# Patient Record
(demographics unavailable — no encounter records)

---

## 2017-04-04 NOTE — NUR
[facility] due to siezure episode that lasted for 30 sec 
pta. Patient is aa03,. in no acute distress, respiration even and unlabored. 
Patient's skin is warm to touch and non diaphoretic. Afebrile,. Noted with left 
eye brow open wound sp fall post seizure. Unknown tetanus vaccine date iv on 
rac 20 intact and patent

## 2019-03-20 NOTE — NUR
RN Notes



Received patient awake, verbal and oriented x1 with mental retardation. Patient denies any 
pain and discomfort at this time. Telemonitor reads sinus rhythm with heart rate at 88. IV 
access on right AC patent and intact with ongoing IVF infusing well. Safety measures and 
fall precaution in place with call light within reach with sitter at bedside. Will continue 
to monitor patient.

## 2019-03-20 NOTE — NUR
PT GABINO FROM ADULT  CTR WITH CAREGIVER S/P SZ; PT ALERT, AWAKE, 
VERBALLY REPSONIVE, NAD NOTED, VSS, PT ON MONITOR, MD AT BEDSIDE FOR EVAL

## 2019-03-20 NOTE — NUR
Tele/RN - Admission

Received patient from ER, awake, alert to self, developmentally delayed, verbally 
responsive, reality orientation given. Patient is stable on room air, denies pain at this 
time, tele shows SR, VSS, no seizure activity seen. Skin assessment done, noted with 
blanchable redness, photo taken. patient independent with bed mobility. Patient oriented to 
the room and use of call light. All belongings accounted. Seizure, fall and aspiration 
precautions initiated. Admission orders noted and implemented. Patient resides at [facility], records in the chart. Will continue with current medical management and will 
endorsed to night RN accordingly.

## 2019-03-20 NOTE — NUR
REPORT GIVEN TO ROSALIE SCHAEFER FOR GHISLAINE; PT WILL BE TRANSPORTED TO [address] [place] [address] VIA ACLS 
PROTOCOL

## 2019-03-21 NOTE — NUR
MS RN NOTE



RECEIVED PT IN STABLE CONDITION A&O X1. NO SIGNS OF SOB OR DISTRESS, NO C/O PAIN. 1:1 SITTER 
AT BEDSIDE. ALL CURRENT NEEDS MET. SAFETY MEASURES IN PLACE: BED LOW, LOCKED, UPPER RAILS 
UP, FALL PRECAUTIONS, SEIZURE PRECAUTIONS, CALL LIGHT WITHIN REACH. WILL CONT TO MONITOR.

## 2019-03-21 NOTE — NUR
MS RN NOTES

PATIENT IN BED ALERT ORIENTED X 1. NO ACUTE DISTRESS NOTED. BREATHING UNLABORED. NO SOB 
NOTED. SITTER AT BEDSIDE. IV ACCESS PATENT AND INTACT, NO REDNESS OR SWELLING NOTED. DUE 
MEDICATIONS GIVEN, NO ASE NOTED. SAFETY MEASURES IN PLACE. HOB ELEVATED. CALL LIGHT WITHIN 
REACH. WILL CONTINUE TO MONITOR AND ENDORSE TO NIGHT NURSE FOR CONTINUITY OF CARE.

## 2019-03-21 NOTE — NUR
MS RN NOTE



PT SEEN AND EXAMINED BY DR. DUPREE WITH NEW ORDER TO INCREASE VIMPAT  MG Q12. NEW 
ORDER CARRIED OUT.

## 2019-03-21 NOTE — NUR
RN Notes



Patient slept on and off, with some confusion. Denies pain, nausea and vomiting. Tele 
monitor reads sinus rhythm with heart rate at 67. No episode of seizure noted. Seizure 
precaution observed. No significant change in condition noted. Safety measures observed with 
sitter at bedside. Patient assisted to the bathroom with assist. kept clean and dry. All 
needs attended. Will endorse to morning RN for continuity of care.

## 2019-03-21 NOTE — NUR
TELE RN NOTES

PATIENT IN BED EYES CLOSED, EASY TO AROUSE. NO ACUTE DISTRESS NOTED. BREATHING UNLABORED. NO 
SOB NOTED. SITTER AT BEDSIDE. IV ACCESS PATENT AND INTACT, NO REDNESS OR SWELLING NOTED. 
SAFETY MEAURES IN PLACE. CALL LIGHT WITHIN REACH. WILL CONTINUE TO MONITOR ACCORDINGLY.

## 2019-03-22 NOTE — NUR
MS RN NOTE



PT IN STABLE CONDITION A&O X1. NO SIGNS OF SOB OR DISTRESS, NO C/O PAIN. SITTER AT BEDSIDE. 
ALL CURRENT NEEDS MET. SAFETY MEASURES IN PLACE: BED LOW, LOCKED, UPPER RAILS UP, FALL 
PRECAUTIONS, SEIZURE PRECAUTIONS, CALL LIGHT WITHIN REACH. WILL CONT TO MONITOR AND ENDORSE 
TO NEXT SHIFT FOR GHISLAINE.

## 2019-03-22 NOTE — NUR
MS RN DISCHARGE NOTES



PATIENT DISCHARGED HOME AT [facility] WITH STABLE VITAL SIGNS,ALERT ORIENTED X 1. NO 
ACUTE DISTRESS NOTED. BREATHING UNLABORED. NO SOB NOTED. DISCHARGE INSTRUCTIONS HANDED OVER 
TO EMT PERSONNEL INCLUDING NEW PRESCRIPTION. ALL BELONGINS ACCOUNTED FOR. SKIN IS INTACT. IV 
ACCESS REMOVED, NO BLEEDING OR REDNESS NOTED. PATIENT PICKED UP VIA AMBULANCE IN A GURNEY 
ACCOMPANIED BY 2 EMT PERSONNEL IN STABLE CONDITION.

## 2019-03-22 NOTE — NUR
MS RN NOTES

MEDICATION VIMPAT NOT AVAILABLE AT THIS TIME PER PHARMACY, SPOKE WITH PHARMACIST BLAS. NP 
EZRA JUNG NOTIFIED.

## 2019-03-22 NOTE — NUR
MS RN NOTES

PATIENT IN BED EYES CLOSED, EASY TO AROUSE. NO ACUTE DISTRESS NOTED. BREATHING UNLABORED. NO 
SOB NOTED. SITTER AT BEDSIDE. IV ACCESS PATENT AND INTACT, NO REDNESS OR SWELLING NOTED. 
SAFETY MEASURES IN PLACE. CALL LIGHT WITHIN REACH. WILL CONTINUE TO MONITOR ACCORDINGLY.